# Patient Record
Sex: FEMALE | Race: OTHER | HISPANIC OR LATINO | URBAN - METROPOLITAN AREA
[De-identification: names, ages, dates, MRNs, and addresses within clinical notes are randomized per-mention and may not be internally consistent; named-entity substitution may affect disease eponyms.]

---

## 2021-04-17 ENCOUNTER — HOSPITAL ENCOUNTER (EMERGENCY)
Facility: HOSPITAL | Age: 21
Discharge: HOME | End: 2021-04-17
Attending: EMERGENCY MEDICINE
Payer: COMMERCIAL

## 2021-04-17 VITALS
RESPIRATION RATE: 18 BRPM | HEART RATE: 95 BPM | TEMPERATURE: 98 F | SYSTOLIC BLOOD PRESSURE: 123 MMHG | HEIGHT: 59 IN | WEIGHT: 95 LBS | DIASTOLIC BLOOD PRESSURE: 69 MMHG | BODY MASS INDEX: 19.15 KG/M2 | OXYGEN SATURATION: 99 %

## 2021-04-17 DIAGNOSIS — R11.2 NAUSEA & VOMITING: Primary | ICD-10-CM

## 2021-04-17 DIAGNOSIS — R10.9 ABDOMINAL PAIN: ICD-10-CM

## 2021-04-17 LAB
ALBUMIN SERPL BCP-MCNC: 4.2 G/DL (ref 3.5–5)
ALP SERPL-CCNC: 64 U/L (ref 46–116)
ALT SERPL W P-5'-P-CCNC: 17 U/L (ref 12–78)
ANION GAP SERPL CALCULATED.3IONS-SCNC: 12 MMOL/L (ref 4–13)
AST SERPL W P-5'-P-CCNC: 14 U/L (ref 5–45)
BACTERIA UR QL AUTO: ABNORMAL /HPF
BASOPHILS # BLD AUTO: 0.05 THOUSANDS/ΜL (ref 0–0.1)
BASOPHILS NFR BLD AUTO: 1 % (ref 0–1)
BILIRUB SERPL-MCNC: 0.3 MG/DL (ref 0.2–1)
BILIRUB UR QL STRIP: NEGATIVE
BUN SERPL-MCNC: 10 MG/DL (ref 5–25)
CALCIUM SERPL-MCNC: 9.1 MG/DL (ref 8.3–10.1)
CHLORIDE SERPL-SCNC: 104 MMOL/L (ref 100–108)
CLARITY UR: ABNORMAL
CO2 SERPL-SCNC: 22 MMOL/L (ref 21–32)
COLOR UR: YELLOW
CREAT SERPL-MCNC: 0.62 MG/DL (ref 0.6–1.3)
EOSINOPHIL # BLD AUTO: 0.07 THOUSAND/ΜL (ref 0–0.61)
EOSINOPHIL NFR BLD AUTO: 1 % (ref 0–6)
ERYTHROCYTE [DISTWIDTH] IN BLOOD BY AUTOMATED COUNT: 12.1 % (ref 11.6–15.1)
EXT PREG TEST URINE: NEGATIVE
EXT. CONTROL ED NAV: NORMAL
GFR SERPL CREATININE-BSD FRML MDRD: 129 ML/MIN/1.73SQ M
GLUCOSE SERPL-MCNC: 118 MG/DL (ref 65–140)
GLUCOSE UR STRIP-MCNC: NEGATIVE MG/DL
HCT VFR BLD AUTO: 39.5 % (ref 34.8–46.1)
HGB BLD-MCNC: 13.2 G/DL (ref 11.5–15.4)
HGB UR QL STRIP.AUTO: NEGATIVE
IMM GRANULOCYTES # BLD AUTO: 0.03 THOUSAND/UL (ref 0–0.2)
IMM GRANULOCYTES NFR BLD AUTO: 0 % (ref 0–2)
KETONES UR STRIP-MCNC: NEGATIVE MG/DL
LEUKOCYTE ESTERASE UR QL STRIP: ABNORMAL
LIPASE SERPL-CCNC: 66 U/L (ref 73–393)
LYMPHOCYTES # BLD AUTO: 1.24 THOUSANDS/ΜL (ref 0.6–4.47)
LYMPHOCYTES NFR BLD AUTO: 13 % (ref 14–44)
MCH RBC QN AUTO: 30.2 PG (ref 26.8–34.3)
MCHC RBC AUTO-ENTMCNC: 33.4 G/DL (ref 31.4–37.4)
MCV RBC AUTO: 90 FL (ref 82–98)
MONOCYTES # BLD AUTO: 0.34 THOUSAND/ΜL (ref 0.17–1.22)
MONOCYTES NFR BLD AUTO: 4 % (ref 4–12)
NEUTROPHILS # BLD AUTO: 7.83 THOUSANDS/ΜL (ref 1.85–7.62)
NEUTS SEG NFR BLD AUTO: 81 % (ref 43–75)
NITRITE UR QL STRIP: NEGATIVE
NON-SQ EPI CELLS URNS QL MICRO: ABNORMAL /HPF
NRBC BLD AUTO-RTO: 0 /100 WBCS
PH UR STRIP.AUTO: 7.5 [PH]
PLATELET # BLD AUTO: 287 THOUSANDS/UL (ref 149–390)
PMV BLD AUTO: 10.2 FL (ref 8.9–12.7)
POTASSIUM SERPL-SCNC: 3.8 MMOL/L (ref 3.5–5.3)
PROT SERPL-MCNC: 8.2 G/DL (ref 6.4–8.2)
PROT UR STRIP-MCNC: NEGATIVE MG/DL
RBC # BLD AUTO: 4.37 MILLION/UL (ref 3.81–5.12)
RBC #/AREA URNS AUTO: ABNORMAL /HPF
SODIUM SERPL-SCNC: 138 MMOL/L (ref 136–145)
SP GR UR STRIP.AUTO: 1.02 (ref 1–1.03)
UROBILINOGEN UR QL STRIP.AUTO: 0.2 E.U./DL
WBC # BLD AUTO: 9.56 THOUSAND/UL (ref 4.31–10.16)
WBC #/AREA URNS AUTO: ABNORMAL /HPF

## 2021-04-17 PROCEDURE — 83690 ASSAY OF LIPASE: CPT | Performed by: EMERGENCY MEDICINE

## 2021-04-17 PROCEDURE — 81025 URINE PREGNANCY TEST: CPT | Performed by: EMERGENCY MEDICINE

## 2021-04-17 PROCEDURE — 87491 CHLMYD TRACH DNA AMP PROBE: CPT | Performed by: EMERGENCY MEDICINE

## 2021-04-17 PROCEDURE — 99284 EMERGENCY DEPT VISIT MOD MDM: CPT | Performed by: EMERGENCY MEDICINE

## 2021-04-17 PROCEDURE — 81001 URINALYSIS AUTO W/SCOPE: CPT | Performed by: EMERGENCY MEDICINE

## 2021-04-17 PROCEDURE — 85025 COMPLETE CBC W/AUTO DIFF WBC: CPT | Performed by: EMERGENCY MEDICINE

## 2021-04-17 PROCEDURE — 99283 EMERGENCY DEPT VISIT LOW MDM: CPT

## 2021-04-17 PROCEDURE — 80053 COMPREHEN METABOLIC PANEL: CPT | Performed by: EMERGENCY MEDICINE

## 2021-04-17 PROCEDURE — 87591 N.GONORRHOEAE DNA AMP PROB: CPT | Performed by: EMERGENCY MEDICINE

## 2021-04-17 PROCEDURE — 96361 HYDRATE IV INFUSION ADD-ON: CPT

## 2021-04-17 PROCEDURE — 36415 COLL VENOUS BLD VENIPUNCTURE: CPT | Performed by: EMERGENCY MEDICINE

## 2021-04-17 PROCEDURE — 96375 TX/PRO/DX INJ NEW DRUG ADDON: CPT

## 2021-04-17 PROCEDURE — 96374 THER/PROPH/DIAG INJ IV PUSH: CPT

## 2021-04-17 RX ORDER — PENICILLIN V POTASSIUM 250 MG/1
250 TABLET ORAL 4 TIMES DAILY
COMMUNITY

## 2021-04-17 RX ORDER — DICYCLOMINE HCL 20 MG
20 TABLET ORAL 2 TIMES DAILY PRN
Qty: 14 TABLET | Refills: 0 | Status: SHIPPED | OUTPATIENT
Start: 2021-04-17

## 2021-04-17 RX ORDER — ONDANSETRON 2 MG/ML
4 INJECTION INTRAMUSCULAR; INTRAVENOUS ONCE
Status: COMPLETED | OUTPATIENT
Start: 2021-04-17 | End: 2021-04-17

## 2021-04-17 RX ORDER — LISINOPRIL 5 MG/1
5 TABLET ORAL DAILY
COMMUNITY

## 2021-04-17 RX ORDER — MAGNESIUM HYDROXIDE/ALUMINUM HYDROXICE/SIMETHICONE 120; 1200; 1200 MG/30ML; MG/30ML; MG/30ML
30 SUSPENSION ORAL ONCE
Status: COMPLETED | OUTPATIENT
Start: 2021-04-17 | End: 2021-04-17

## 2021-04-17 RX ORDER — ONDANSETRON 4 MG/1
4 TABLET, ORALLY DISINTEGRATING ORAL EVERY 6 HOURS PRN
Qty: 20 TABLET | Refills: 0 | Status: SHIPPED | OUTPATIENT
Start: 2021-04-17

## 2021-04-17 RX ADMIN — ONDANSETRON 4 MG: 2 INJECTION INTRAMUSCULAR; INTRAVENOUS at 06:08

## 2021-04-17 RX ADMIN — FAMOTIDINE 20 MG: 10 INJECTION INTRAVENOUS at 06:15

## 2021-04-17 RX ADMIN — ALUMINUM HYDROXIDE, MAGNESIUM HYDROXIDE, AND SIMETHICONE 30 ML: 200; 200; 20 SUSPENSION ORAL at 06:15

## 2021-04-17 RX ADMIN — SODIUM CHLORIDE 1000 ML: 0.9 INJECTION, SOLUTION INTRAVENOUS at 06:10

## 2021-04-17 NOTE — ED PROVIDER NOTES
History  Chief Complaint   Patient presents with    Vomiting     Pt to ED w c/o of vomiting since 0300 and "generalized abdominal pain"     This is a 77-year-old female past medical history of rheumatic heart disease on lisinopril and penicillin presents for evaluation of multiple episodes of nonbilious nonbloody vomitus since 03:00  It is associated with crampy diffuse abdominal pain which gets better after she vomits  No diarrhea  She did have chills but no reported fever  She had a hot dog and burger at home last night and other members of the family had the same thing with nobody else being symptomatic  She otherwise denies any chest pain or shortness of breath  Denies any cough denies any sore throat or loss of taste or smell  No concern for COVID exposure  She denies any urinary symptoms denies any vaginal discharge or abnormal bleeding  She is sexually active with 1 male partner who is her fiance and she does not use barrier protection  She does not have any concerns for STI exposure  She denies any drug or alcohol use  Denies any similar symptoms in the past   No medications taken prior to arrival          Prior to Admission Medications   Prescriptions Last Dose Informant Patient Reported? Taking?   lisinopril (ZESTRIL) 5 mg tablet   Yes Yes   Sig: Take 5 mg by mouth daily   penicillin V potassium (VEETID) 250 mg tablet   Yes Yes   Sig: Take 250 mg by mouth 4 (four) times a day      Facility-Administered Medications: None       Past Medical History:   Diagnosis Date    Rheumatic heart disease        History reviewed  No pertinent surgical history  History reviewed  No pertinent family history  I have reviewed and agree with the history as documented      E-Cigarette/Vaping    E-Cigarette Use Never User      E-Cigarette/Vaping Substances    Nicotine No     THC No     CBD No     Flavoring No     Other No     Unknown No      Social History     Tobacco Use    Smoking status: Never Smoker  Smokeless tobacco: Never Used   Substance Use Topics    Alcohol use: Never     Frequency: Never    Drug use: Never       Review of Systems   Constitutional: Positive for chills  Negative for appetite change and fever  HENT: Negative for rhinorrhea and sore throat  Eyes: Negative for photophobia and visual disturbance  Respiratory: Negative for cough, chest tightness and wheezing  Cardiovascular: Negative for chest pain, palpitations and leg swelling  Gastrointestinal: Positive for nausea and vomiting  Negative for abdominal distention, abdominal pain, blood in stool, constipation and diarrhea  Genitourinary: Negative for dysuria, flank pain, frequency, hematuria and urgency  Musculoskeletal: Negative for back pain  Skin: Negative for rash  Neurological: Negative for dizziness, weakness and headaches  All other systems reviewed and are negative  Physical Exam  Physical Exam  Vitals signs and nursing note reviewed  Constitutional:       Appearance: She is well-developed  HENT:      Head: Normocephalic and atraumatic  Eyes:      Pupils: Pupils are equal, round, and reactive to light  Neck:      Musculoskeletal: Normal range of motion and neck supple  Cardiovascular:      Rate and Rhythm: Normal rate and regular rhythm  Heart sounds: No murmur  No friction rub  No gallop  Pulmonary:      Effort: Pulmonary effort is normal       Breath sounds: No wheezing or rales  Chest:      Chest wall: No tenderness  Abdominal:      General: There is no distension  Palpations: Abdomen is soft  There is no mass  Tenderness: There is no abdominal tenderness  There is no guarding or rebound  Comments: Soft nontender abdomen without any rebound or guarding  Skin:     General: Skin is warm and dry  Neurological:      Mental Status: She is alert and oriented to person, place, and time           Vital Signs  ED Triage Vitals   Temperature Pulse Respirations Blood Pressure SpO2   04/17/21 0613 04/17/21 0601 04/17/21 0601 04/17/21 0601 04/17/21 0601   98 °F (36 7 °C) 95 18 123/69 99 %      Temp src Heart Rate Source Patient Position - Orthostatic VS BP Location FiO2 (%)   -- 04/17/21 0601 -- -- --    Monitor         Pain Score       --                  Vitals:    04/17/21 0601   BP: 123/69   Pulse: 95         Visual Acuity      ED Medications  Medications   sodium chloride 0 9 % bolus 1,000 mL (0 mL Intravenous Stopped 4/17/21 0757)   ondansetron (ZOFRAN) injection 4 mg (4 mg Intravenous Given 4/17/21 0608)   famotidine (PEPCID) injection 20 mg (20 mg Intravenous Given 4/17/21 0615)   aluminum-magnesium hydroxide-simethicone (MYLANTA) oral suspension 30 mL (30 mL Oral Given 4/17/21 0615)       Diagnostic Studies  Results Reviewed     Procedure Component Value Units Date/Time    Chlamydia/GC amplified DNA by PCR [[id number]]  (Normal) Collected: 04/17/21 0702    Lab Status: Final result Specimen: Urine, Other Updated: 04/20/21 0607     N gonorrhoeae, DNA Probe Negative     Chlamydia trachomatis, DNA Probe Negative    Narrative:      Test performed using PCR amplification of target DNA  This test is intended as an aid in the diagnosis of Chlamydial and gonococcal disease  This test has not been evaluated in patients younger than 15years of age and is not recommended for evaluation of suspected sexual abuse  Additional testing is recommended when the results do not correlate with clinical signs and symptoms        Urine Microscopic [[id number]]  (Abnormal) Collected: 04/17/21 0654    Lab Status: Final result Specimen: Urine, Clean Catch Updated: 04/17/21 0746     RBC, UA None Seen /hpf      WBC, UA 2-4 /hpf      Epithelial Cells Moderate /hpf      Bacteria, UA Occasional /hpf     UA w Reflex to Microscopic w Reflex to Culture [[id number]]  (Abnormal) Collected: 04/17/21 0654    Lab Status: Final result Specimen: Urine, Clean Catch Updated: 04/17/21 0722     Color, UA Yellow Clarity, UA Slightly Cloudy     Specific Kunkle, UA 1 020     pH, UA 7 5     Leukocytes, UA Moderate     Nitrite, UA Negative     Protein, UA Negative mg/dl      Glucose, UA Negative mg/dl      Ketones, UA Negative mg/dl      Urobilinogen, UA 0 2 E U /dl      Bilirubin, UA Negative     Blood, UA Negative    POCT pregnancy, urine [[id number]]  (Normal) Resulted: 04/17/21 0658    Lab Status: Final result Updated: 04/17/21 0659     EXT PREG TEST UR (Ref: Negative) Negative     Control Valid    Comprehensive metabolic panel [[id number]] Collected: 04/17/21 0607    Lab Status: Final result Specimen: Blood from Arm, Right Updated: 04/17/21 0634     Sodium 138 mmol/L      Potassium 3 8 mmol/L      Chloride 104 mmol/L      CO2 22 mmol/L      ANION GAP 12 mmol/L      BUN 10 mg/dL      Creatinine 0 62 mg/dL      Glucose 118 mg/dL      Calcium 9 1 mg/dL      AST 14 U/L      ALT 17 U/L      Alkaline Phosphatase 64 U/L      Total Protein 8 2 g/dL      Albumin 4 2 g/dL      Total Bilirubin 0 30 mg/dL      eGFR 129 ml/min/1 73sq m     Narrative:      [facility] guidelines for Chronic Kidney Disease (CKD):     Stage 1 with normal or high GFR (GFR > 90 mL/min/1 73 square meters)    Stage 2 Mild CKD (GFR = 60-89 mL/min/1 73 square meters)    Stage 3A Moderate CKD (GFR = 45-59 mL/min/1 73 square meters)    Stage 3B Moderate CKD (GFR = 30-44 mL/min/1 73 square meters)    Stage 4 Severe CKD (GFR = 15-29 mL/min/1 73 square meters)    Stage 5 End Stage CKD (GFR <15 mL/min/1 73 square meters)  Note: GFR calculation is accurate only with a steady state creatinine    Lipase [[id number]]  (Abnormal) Collected: 04/17/21 0607    Lab Status: Final result Specimen: Blood from Arm, Right Updated: 04/17/21 0634     Lipase 66 u/L     CBC and differential [[id number]]  (Abnormal) Collected: 04/17/21 0607    Lab Status: Final result Specimen: Blood from Arm, Right Updated: 04/17/21 0624     WBC 9 56 Thousand/uL      RBC 4 37 Million/uL      Hemoglobin 13 2 g/dL      Hematocrit 39 5 %      MCV 90 fL      MCH 30 2 pg      MCHC 33 4 g/dL      RDW 12 1 %      MPV 10 2 fL      Platelets 847 Thousands/uL      nRBC 0 /100 WBCs      Neutrophils Relative 81 %      Immat GRANS % 0 %      Lymphocytes Relative 13 %      Monocytes Relative 4 %      Eosinophils Relative 1 %      Basophils Relative 1 %      Neutrophils Absolute 7 83 Thousands/µL      Immature Grans Absolute 0 03 Thousand/uL      Lymphocytes Absolute 1 24 Thousands/µL      Monocytes Absolute 0 34 Thousand/µL      Eosinophils Absolute 0 07 Thousand/µL      Basophils Absolute 0 05 Thousands/µL                  No orders to display              Procedures  Procedures         ED Course  ED Course as of Apr 20 2214   Sat Apr 17, 2021   6921 Patient resting comfortably no acute complaints, will likely discharge with careful return precautions, PCP follow-up                                              MDM  Number of Diagnoses or Management Options  Diagnosis management comments: 24year old female presents for evaluation of chills, nausea vomiting with intermittent abdominal pain only present prior to vomiting and gets better after she vomits  No current abdominal pain she is well-appearing with a benign abdominal exam including on deep palpation  Will treat symptomatically, check lab work and re-evaluate      Disposition  Final diagnoses:   Nausea & vomiting   Abdominal pain     Time reflects when diagnosis was documented in both MDM as applicable and the Disposition within this note     Time User Action Codes Description Comment    4/17/2021  6:41 AM Kaleb Butterfielde Add [R11 2] Nausea & vomiting     4/17/2021  6:41 AM Kaleb Lanette Add [R10 9] Abdominal pain       ED Disposition     ED Disposition Condition Date/Time Comment    Discharge Stable Sat Apr 17, 2021  6:44 AM Annabel Rodas discharge to home/self care              Follow-up Information     Follow up With Specialties Details Why Contact Info Additional Information     [address] Emergency Department Emergency Medicine  If symptoms worsen [address] [place],[address] [address]  [address] [facility] Emergency Department, [address] [place], [facility]          Discharge Medication List as of 4/17/2021  6:44 AM      START taking these medications    Details   dicyclomine (BENTYL) 20 mg tablet Take 1 tablet (20 mg total) by mouth 2 (two) times a day as needed (abdominal pain), Starting Sat 4/17/2021, Print      ondansetron (ZOFRAN-ODT) 4 mg disintegrating tablet Take 1 tablet (4 mg total) by mouth every 6 (six) hours as needed for nausea or vomiting, Starting Sat 4/17/2021, Print         CONTINUE these medications which have NOT CHANGED    Details   lisinopril (ZESTRIL) 5 mg tablet Take 5 mg by mouth daily, Historical Med      penicillin V potassium (VEETID) 250 mg tablet Take 250 mg by mouth 4 (four) times a day, Historical Med           No discharge procedures on file      PDMP Review     None          ED Provider  Electronically Signed by           Geoffrey Silva MD  04/20/21 2924

## 2021-04-17 NOTE — ED NOTES
Ambulatory with steady gait to exit, no n/v/d noted while in ED, Rx given at discharge     Geri Shaw RN  04/17/21 1750 March 22, 2021     [place], [address] [address]    Patient: Estevan Duffy   YOB: 1967   Date of Visit: 3/22/2021       Dear Dr Sydnee Lepe:    Thank you for referring Estevan Duffy to me for evaluation  Below are my notes for this consultation  If you have questions, please do not hesitate to call me  I look forward to following your patient along with you  Sincerely,        Bryanna Gandhi MD        CC: MD Luis Gunter, MD Neva Clemente MD Joanell Felts, MD Sadie Conte, Ej Mckeon MD  3/22/2021 10:52 AM  Sign when Signing Visit     Surgical Oncology Follow Up       [address] [facility] [address] [address]  [phone]    Estevan Duffy  1967  [id number]  [address] [place] [address] [facility] Ashwini Ballesteros  [address] [address]  [phone]    Chief Complaint   Patient presents with    Follow-up     F/U after Sestamibi scan and labs       Assessment/Plan:    No problem-specific Assessment & Plan notes found for this encounter  Diagnoses and all orders for this visit:    Hyperparathyroidism [facility])        Advance Care Planning/Advance Directives:  Discussed disease status, cancer treatment plans and/or cancer treatment goals with the patient  Oncology History    No history exists  History of Present Illness:   -Interval History: patient is a 80-year-old woman here for follow-up status post CT scan to look for potential parathyroid adenoma  Diagnosis of persistent hyperparathyroidism  Review of Systems:  Review of Systems   Constitutional: Negative  HENT: Negative  Eyes: Negative  Respiratory: Negative  Cardiovascular: Negative  Gastrointestinal: Negative  Endocrine: Negative  Genitourinary: Positive for vaginal bleeding     Musculoskeletal: Negative  Skin: Negative  Allergic/Immunologic: Negative  Neurological: Negative  Hematological: Negative  Psychiatric/Behavioral: Negative          Patient Active Problem List   Diagnosis    Chronic deep vein thrombosis (DVT) of proximal vein of both lower extremities (HCC)    History of ITP    Benign hypertensive CKD    Chronic kidney disease (CKD), stage II (mild)    Edema    Gross hematuria    Hypercalcemia    Hyperparathyroidism ([facility] )    Hypertension    Nephrolithiasis    Proteinuria    Systemic lupus erythematosus (HCC)    Vitamin D deficiency    Elevated PTHrP level    History of thyroid cancer    Upper respiratory tract infection    Rash    Left ear pain    BMI 39 0-39 9,adult    Post-menopausal bleeding    Cellulitis of right index finger    Greater trochanteric bursitis of left hip    Anti-phospholipid syndrome (HCC)     Past Medical History:   Diagnosis Date    Chronic kidney disease     Gestational diabetes     Hyperparathyroidism ([facility] )     Hypertension     Idiopathic thrombocytopenic purpura (ITP) (HCC)     Lupus ([facility] )     Vitamin D deficiency      Past Surgical History:   Procedure Laterality Date     SECTION      7911/3024    HERNIA REPAIR      HIP SURGERY      left side, bone decompression    THYROID SURGERY       Family History   Problem Relation Age of Onset    Diabetes type II Father     Diabetes Father     Stroke Father     Diabetes type II Paternal Aunt     Diabetes type II Paternal Uncle     Diabetes type II Paternal Grandmother     Stomach cancer Paternal Grandfather      Social History     Socioeconomic History    Marital status: /Civil Union     Spouse name: Not on file    Number of children: Not on file    Years of education: Not on file    Highest education level: Not on file   Occupational History    Not on file   Social Needs    Financial resource strain: Not on file    Food insecurity     Worry: Not on file Inability: Not on file    Transportation needs     Medical: Not on file     Non-medical: Not on file   Tobacco Use    Smoking status: Never Smoker    Smokeless tobacco: Never Used   Substance and Sexual Activity    Alcohol use: Not Currently    Drug use: No    Sexual activity: Not on file   Lifestyle    Physical activity     Days per week: Not on file     Minutes per session: Not on file    Stress: Not on file   Relationships    Social connections     Talks on phone: Not on file     Gets together: Not on file     Attends Yazdanism service: Not on file     Active member of club or organization: Not on file     Attends meetings of clubs or organizations: Not on file     Relationship status: Not on file    Intimate partner violence     Fear of current or ex partner: Not on file     Emotionally abused: Not on file     Physically abused: Not on file     Forced sexual activity: Not on file   Other Topics Concern    Not on file   Social History Narrative    Not on file       Current Outpatient Medications:     cholecalciferol (VITAMIN D3) 400 units tablet, Take 1 tablet (400 Units total) by mouth daily, Disp: 30 tablet, Rfl: 4    folic acid (FOLVITE) 1 mg tablet, Take 1 tablet by mouth daily, Disp: , Rfl:     fosinopril (MONOPRIL) 20 mg tablet, Take 1 tablet (20 mg total) by mouth 2 (two) times a day, Disp: 180 tablet, Rfl: 0    levothyroxine 125 mcg tablet, TAKE ONE TABLET BY MOUTH EVERY DAY MON-SAT , Disp: 90 tablet, Rfl: 0    NIFEdipine (PROCARDIA XL) 30 mg 24 hr tablet, Take 1 tablet (30 mg total) by mouth daily, Disp: 90 tablet, Rfl: 3    nystatin (MYCOSTATIN) powder, Apply topically 2 (two) times a day, Disp: 60 g, Rfl: 2    spironolactone (ALDACTONE) 25 mg tablet, Take 1 tablet (25 mg total) by mouth daily, Disp: 90 tablet, Rfl: 3    warfarin (COUMADIN) 3 mg tablet, TAKE 1 TABLET DAILY except on Mondays and Thursdays (Patient taking differently: TAKE 1 TABLET DAILY except Thursdays), Disp: 90 tablet, Rfl: 4  Allergies   Allergen Reactions    Penicillins Itching     Vitals:    03/22/21 1007   BP: 128/80   Pulse: (!) 50   Resp: 16   Temp: (!) 97 °F (36 1 °C)       Physical Exam  Constitutional:       Appearance: Normal appearance  HENT:      Head: Normocephalic and atraumatic  Nose: Nose normal    Eyes:      Conjunctiva/sclera: Conjunctivae normal    Neck:      Musculoskeletal: Normal range of motion and neck supple  Musculoskeletal: Normal range of motion  Skin:     General: Skin is warm and dry  Neurological:      General: No focal deficit present  Mental Status: She is alert and oriented to person, place, and time  Psychiatric:         Mood and Affect: Mood normal          Behavior: Behavior normal          Thought Content: Thought content normal          Judgment: Judgment normal            Results:  Labs:  Lab Results   Component Value Date     9 (H) 03/20/2021    CALCIUM 10 4 (H) 03/20/2021    PHOS 2 4 (L) 11/18/2017         Imaging  Xr Hips Bilateral 3-4 Vw W Pelvis If Performed    Result Date: 3/15/2021  Narrative: BILATERAL HIPS AND PELVIS INDICATION:   M25 [id number]: Pain in unspecified hip  Dr Vieira Stains note from 3/9/2020 was reviewed  Patient has known history of avascular necrosis of the left hip status post core decompression 28 years ago  COMPARISON:  Abdomen and pelvic CT from 2/2/2021  VIEWS:  XR HIPS BILATERAL 3-4 VW W PELVIS IF PERFORMED  FINDINGS: The bony pelvis appears intact  There is contrast material urinary bladder  Visualized lower lumbar spine is unremarkable  LEFT HIP: There is no acute fracture or dislocation  Sclerotic changes in the femoral head consistent with avascular necrosis  Mild left hip osteoarthritis  This is evidenced by joint space narrowing  No lytic or blastic osseous lesion  Soft tissues are unremarkable  RIGHT HIP: There is no acute fracture or dislocation   Subtle sclerosis of the right femoral head consistent with avascular necrosis, better visualized on prior CT from 2/2/2021  There is no associated right hip osteoarthritis  No lytic or blastic osseous lesion  Soft tissues are unremarkable  Impression: Sclerotic changes in the femoral head consistent with avascular necrosis  Mild left hip osteoarthritis  Subtle sclerosis of the right femoral head consistent with avascular necrosis, better visualized on prior CT from 2/2/2021  There is no associated right hip osteoarthritis  Workstation performed: [id number]    Ct Parathyroid Study W Wo Contrast    Result Date: 3/11/2021  Narrative: CT  NECK  WITHOUT AND WITH CONTRAST FROM ZULAY TO SKULL BASE INDICATION: Hyperparathyroidism  Possible lesion identified on previous scan dated May 24, 2019  COMPARISON:  Parathyroid CT dated May 24, 2019  TECHNIQUE:This examination, like all CT scans performed in the [facility], was performed utilizing techniques to minimize radiation dose exposure, including the use of iterative reconstruction and automated exposure control  Both noncontrast, contrast, and post contrast delayed images were performed according to standard parathyroid protocol (4D technique) Coronal and sagittal reconstructions were performed  3D reconstructions were performed on an independent workstation, and are supplied for review  100 mL of iohexol (OMNIPAQUE) was injected intravenously without immediate consequence  Radiation dose: 1698 88 mGy-cm FINDINGS: SERIAL NONCONTRAST, POST CONTRAST AND DELAYS: The lesion identified previously is again seen, however, the streak artifact is again particularly prominent, more so than on the previous study, and confirmation of enhancement pattern is not reliably possible  The lesion has decreased in size, now measuring 0 59 x 0 50 x 0 57 cm, and still has some significant enhancement on the delayed images  The findings are more consistent with a lymph node rather than an adenoma   There is a 0 41 x 0 34 x 0 55 cm lesion in the right neck in the thyroid bed, posterior to a small amount of residual thyroid remaining on the right  This lesion was present previously but was significantly smaller in size and was believed to represent a part of the residual thyroid  The lesion meets the CT enhancement criteria suspicious for parathyroid adenoma  The lesion is best seen on series 4 images 134 through 139  VASCULAR STRUCTURES: There is stable ectasia of the ascending aorta measuring 3 4 cm  There is no carotid stenosis by Nascet criteria  VISUALIZED PARANASAL SINUSES: There is a minimum amount of mucosal thickening noted in the inferior aspect of the right maxillary sinus  The remainder of the sinuses are clear  NASAL CAVITY AND NASOPHARYNX: Normal  SUPRAHYOID NECK: Normal oropharynx, oral cavity, parapharyngeal and retropharyngeal spaces  INFRAHYOID NECK: Normal oropharynx, oral cavity, parapharyngeal and retropharyngeal spaces  THYROID GLAND: The patient is again noted to be postoperative status post partial thyroidectomy  There is a small amount of residual thyroid tissue on the left, and an even smaller amount on the right  The appearance is stable  LYMPH NODES: No pathologic or enlarged adenopathy  MEDIASTINUM: Unremarkable  BONY STRUCTURES Mild osteopenia is present  LUNG APICES: Unremarkable  Impression: 1  Previously seen lesion in the superior mediastinum that had some level of suspicion for adenoma has decreased in size, and the enhancement pattern can still not be reliably tract due to streak artifact  The decrease in size makes this meet lesion more likely to represent a lymph node  2   Enlargement of a previously present but smaller lesion which was believed to be part of the thyroid on the right measuring 0 41 x 0 34 x 0 55 cm  This lesion meets the CT enhancement criteria suspicious for parathyroid adenoma  The lesion is located within the right thyroid bed  3   Stable ectasia of the ascending aorta measuring 3 4 cm   4  Mild mucosal thickening of the inferior aspect of the right maxillary sinus  5   Mild osteopenia  Workstation performed: [id number]    I reviewed the above laboratory and imaging data  Discussion/Summary:  Persistent hyperparathyroidism, suspected right-sided target in setting of prior thyroidectomy  She is amenable to surgery, but will be undergoing procedure with the [facility] team for recent vaginal bleeding  She will get back to us in a month after her gyne procedure

## 2021-04-20 LAB
C TRACH DNA SPEC QL NAA+PROBE: NEGATIVE
N GONORRHOEA DNA SPEC QL NAA+PROBE: NEGATIVE